# Patient Record
Sex: MALE | Race: BLACK OR AFRICAN AMERICAN | NOT HISPANIC OR LATINO | Employment: FULL TIME | URBAN - METROPOLITAN AREA
[De-identification: names, ages, dates, MRNs, and addresses within clinical notes are randomized per-mention and may not be internally consistent; named-entity substitution may affect disease eponyms.]

---

## 2021-05-28 ENCOUNTER — RECORDS - HEALTHEAST (OUTPATIENT)
Dept: ADMINISTRATIVE | Facility: CLINIC | Age: 41
End: 2021-05-28

## 2022-12-24 ENCOUNTER — HOSPITAL ENCOUNTER (EMERGENCY)
Facility: CLINIC | Age: 42
Discharge: HOME | End: 2022-12-24
Attending: PHYSICIAN ASSISTANT | Admitting: PHYSICIAN ASSISTANT
Payer: COMMERCIAL

## 2022-12-24 VITALS
SYSTOLIC BLOOD PRESSURE: 146 MMHG | WEIGHT: 246 LBS | BODY MASS INDEX: 29.05 KG/M2 | HEART RATE: 90 BPM | TEMPERATURE: 98.2 F | HEIGHT: 77 IN | RESPIRATION RATE: 20 BRPM | OXYGEN SATURATION: 95 % | DIASTOLIC BLOOD PRESSURE: 97 MMHG

## 2022-12-24 DIAGNOSIS — S61.209A AVULSION OF FINGER TIP, INITIAL ENCOUNTER: ICD-10-CM

## 2022-12-24 PROCEDURE — 99282 EMERGENCY DEPT VISIT SF MDM: CPT

## 2022-12-24 ASSESSMENT — ENCOUNTER SYMPTOMS
NUMBNESS: 0
WOUND: 1
WEAKNESS: 0

## 2022-12-24 ASSESSMENT — ACTIVITIES OF DAILY LIVING (ADL): ADLS_ACUITY_SCORE: 35

## 2022-12-25 NOTE — DISCHARGE INSTRUCTIONS
EXPLANATION:  The provider has evaluated your thumb wound.  This is an avulsion wound (you cut away the very tip of the skin). This will heal on its own.     HOME CARE:  Wash the wound daily with warm water and dial soap.  Pat dry.  Apply a thin layer of topical antibiotic ointment to the wound area (bacitracin, available over the counter).  Then apply new/clean dressing.   You may take Motrin and/or Tylenol as needed for discomfort.  These medications are available over the counter, use as directed on the label.    RETURN to the EMERGENCY DEPARTMENT if you develop any of the following:  Fever (temp 100.4 or higher)  Redness, swelling, or pus drainage from the wound  Red streak traveling up your hand from the wound area  Trouble moving your thumb due to pain, swelling, or weakness  Trouble breathing  Confusion  Vomiting    The examination and treatment you have received in the Emergency Department has been rendered on an emergency basis only and not intended to be a substitute for complete ongoing medical care.

## 2022-12-25 NOTE — ED TRIAGE NOTES
Pt presents with laceration to the thumb of L hand, reports cut it with a knife PTA. Pt denies blood thinner use.     Triage Assessment     Row Name 12/24/22 5618       Triage Assessment (Adult)    Airway WDL WDL       Respiratory WDL    Respiratory WDL WDL       Skin Circulation/Temperature WDL    Skin Circulation/Temperature WDL WDL       Cardiac WDL    Cardiac WDL WDL       Peripheral/Neurovascular WDL    Peripheral Neurovascular WDL WDL       Cognitive/Neuro/Behavioral WDL    Cognitive/Neuro/Behavioral WDL WDL

## 2022-12-25 NOTE — ED PROVIDER NOTES
"  History     Chief Complaint:  Laceration     41 y/o RHD male presents for evaluation of left thumb tip avulsion wound sustained just PTA while at home.  Was cutting thyme, slipped with knife and accidentally cut thumb tip. Denies N/T/W to thumb.     Not anticoagulated  Unsure last TET  No hx of DM    PCP established  Local resident  Works in marketing.       The history is provided by the patient and medical records. No  was used.      ROS:  Review of Systems   Skin: Positive for wound.   Neurological: Negative for weakness and numbness.     Allergies:  Penicillins     Medications:    No current outpatient medications on file.    Past Medical History:    No past medical history on file.    Past Surgical History:    No past surgical history on file.     Family History:    family history is not on file.    Social History:     PCP: No primary care provider on file.     Physical Exam   Patient Vitals for the past 24 hrs:   BP Temp Temp src Pulse Resp SpO2 Height Weight   12/24/22 1842 -- 98.2  F (36.8  C) Oral -- 20 95 % 1.956 m (6' 5\") 111.6 kg (246 lb)   12/24/22 1841 -- -- -- -- -- 97 % -- --   12/24/22 1840 (!) 146/97 -- -- 90 -- -- -- --      Physical Exam  Vitals reviewed.   Constitutional:       General: He is not in acute distress.     Appearance: Normal appearance. He is not ill-appearing, toxic-appearing or diaphoretic.   HENT:      Head: Normocephalic.      Right Ear: External ear normal.      Left Ear: External ear normal.      Mouth/Throat:      Comments: Mask in place, clear speech.   Eyes:      Conjunctiva/sclera: Conjunctivae normal.   Pulmonary:      Effort: Pulmonary effort is normal.   Musculoskeletal:         General: Normal range of motion.      Cervical back: Normal range of motion and neck supple.      Comments: Left thumb: superficial <1/2cm in diameter avulsion wound to very tip of thumb tip pad.  Distal nail just avulsed/involed. Full ROM of thumb and sensation grossly " intact to light touch to thumb pad. Brisk cap refill to thumb pad.    Skin:     General: Skin is warm.      Capillary Refill: Capillary refill takes less than 2 seconds.   Neurological:      General: No focal deficit present.      Mental Status: He is alert.   Psychiatric:         Mood and Affect: Mood normal.         Behavior: Behavior normal.         Thought Content: Thought content normal.         Judgment: Judgment normal.       Emergency Department Course     Emergency Department Course:       Reviewed:  I reviewed nursing notes, vitals and past medical history    Assessments:  : I obtained history and examined the patient as noted above.   : Re-eval as tech with concern for strike-through on bandage applied.  I removed bandage. No further active bleeding.  Wound redressed by tech.     Disposition:  The patient was discharged to home.     Impression & Plan      Medical Decision Makin y/o RHD male presents for evaluation of left thumb tip avulsion wound sustained just PTA while at home.  Was cutting thyme, slipped with knife and accidentally cut thumb tip. Denies N/T/W to thumb.     Exam as above.  Discussed superficial wound that will heal on its own. Wound cleansed and dressed here.  No concern for underlying Fx or FB.  [facility]  Pt defers update today and this is felt reasonable as low risk Tet injury and within 10 years.  That said we did discuss no cure for tetanus etc.  Pt educated on S/S of infection and the importance of wound care.  Pt educated on S/S that should prompt ED re-eval.  Questions answered. Verbalized understanding. Comfortable with plan and appreciative.     Diagnosis:    ICD-10-CM    1. Avulsion of finger tip, initial encounter  S61.209A          Discharge Medications:  New Prescriptions    No medications on file      2022   Ligia Alonso PA-C [employer], Leah M, PA-C  22